# Patient Record
Sex: FEMALE | Race: OTHER | HISPANIC OR LATINO | URBAN - METROPOLITAN AREA
[De-identification: names, ages, dates, MRNs, and addresses within clinical notes are randomized per-mention and may not be internally consistent; named-entity substitution may affect disease eponyms.]

---

## 2020-11-23 ENCOUNTER — EMERGENCY (EMERGENCY)
Facility: HOSPITAL | Age: 35
LOS: 1 days | Discharge: HOME | End: 2020-11-23
Attending: SPECIALIST
Payer: SELF-PAY

## 2020-11-23 VITALS
DIASTOLIC BLOOD PRESSURE: 79 MMHG | TEMPERATURE: 99 F | WEIGHT: 134.92 LBS | OXYGEN SATURATION: 100 % | HEIGHT: 61 IN | RESPIRATION RATE: 20 BRPM | HEART RATE: 85 BPM | SYSTOLIC BLOOD PRESSURE: 113 MMHG

## 2020-11-23 VITALS
TEMPERATURE: 99 F | DIASTOLIC BLOOD PRESSURE: 78 MMHG | HEART RATE: 78 BPM | RESPIRATION RATE: 18 BRPM | SYSTOLIC BLOOD PRESSURE: 120 MMHG | OXYGEN SATURATION: 98 %

## 2020-11-23 LAB
ACANTHOCYTES BLD QL SMEAR: SLIGHT — SIGNIFICANT CHANGE UP
ALBUMIN SERPL ELPH-MCNC: 4.5 G/DL — SIGNIFICANT CHANGE UP (ref 3.3–5.2)
ALP SERPL-CCNC: 81 U/L — SIGNIFICANT CHANGE UP (ref 40–120)
ALT FLD-CCNC: 9 U/L — SIGNIFICANT CHANGE UP
ANION GAP SERPL CALC-SCNC: 13 MMOL/L — SIGNIFICANT CHANGE UP (ref 5–17)
ANISOCYTOSIS BLD QL: SIGNIFICANT CHANGE UP
APPEARANCE UR: CLEAR — SIGNIFICANT CHANGE UP
AST SERPL-CCNC: 19 U/L — SIGNIFICANT CHANGE UP
BACTERIA # UR AUTO: NEGATIVE — SIGNIFICANT CHANGE UP
BASOPHILS # BLD AUTO: 0.07 K/UL — SIGNIFICANT CHANGE UP (ref 0–0.2)
BASOPHILS NFR BLD AUTO: 1.7 % — SIGNIFICANT CHANGE UP (ref 0–2)
BILIRUB SERPL-MCNC: 0.3 MG/DL — LOW (ref 0.4–2)
BILIRUB UR-MCNC: NEGATIVE — SIGNIFICANT CHANGE UP
BLD GP AB SCN SERPL QL: SIGNIFICANT CHANGE UP
BUN SERPL-MCNC: 7 MG/DL — LOW (ref 8–20)
CALCIUM SERPL-MCNC: 9.5 MG/DL — SIGNIFICANT CHANGE UP (ref 8.6–10.2)
CHLORIDE SERPL-SCNC: 103 MMOL/L — SIGNIFICANT CHANGE UP (ref 98–107)
CO2 SERPL-SCNC: 23 MMOL/L — SIGNIFICANT CHANGE UP (ref 22–29)
COLOR SPEC: YELLOW — SIGNIFICANT CHANGE UP
COMMENT - URINE: SIGNIFICANT CHANGE UP
CREAT SERPL-MCNC: 0.47 MG/DL — LOW (ref 0.5–1.3)
DIFF PNL FLD: ABNORMAL
ELLIPTOCYTES BLD QL SMEAR: SLIGHT — SIGNIFICANT CHANGE UP
EOSINOPHIL # BLD AUTO: 0.04 K/UL — SIGNIFICANT CHANGE UP (ref 0–0.5)
EOSINOPHIL NFR BLD AUTO: 0.9 % — SIGNIFICANT CHANGE UP (ref 0–6)
EPI CELLS # UR: SIGNIFICANT CHANGE UP
FERRITIN SERPL-MCNC: 5 NG/ML — LOW (ref 15–150)
FOLATE SERPL-MCNC: 17.4 NG/ML — SIGNIFICANT CHANGE UP
GIANT PLATELETS BLD QL SMEAR: PRESENT — SIGNIFICANT CHANGE UP
GLUCOSE SERPL-MCNC: 87 MG/DL — SIGNIFICANT CHANGE UP (ref 70–99)
GLUCOSE UR QL: NEGATIVE MG/DL — SIGNIFICANT CHANGE UP
HCG SERPL-ACNC: <4 MIU/ML — SIGNIFICANT CHANGE UP
HCT VFR BLD CALC: 27.3 % — LOW (ref 34.5–45)
HGB BLD-MCNC: 7.3 G/DL — LOW (ref 11.5–15.5)
HYPOCHROMIA BLD QL: SLIGHT — SIGNIFICANT CHANGE UP
IRON SATN MFR SERPL: 13 UG/DL — LOW (ref 37–145)
IRON SATN MFR SERPL: 3 % — LOW (ref 14–50)
KETONES UR-MCNC: NEGATIVE — SIGNIFICANT CHANGE UP
LEUKOCYTE ESTERASE UR-ACNC: ABNORMAL
LIDOCAIN IGE QN: 44 U/L — SIGNIFICANT CHANGE UP (ref 22–51)
LYMPHOCYTES # BLD AUTO: 1.34 K/UL — SIGNIFICANT CHANGE UP (ref 1–3.3)
LYMPHOCYTES # BLD AUTO: 30.4 % — SIGNIFICANT CHANGE UP (ref 13–44)
MANUAL SMEAR VERIFICATION: SIGNIFICANT CHANGE UP
MCHC RBC-ENTMCNC: 17.8 PG — LOW (ref 27–34)
MCHC RBC-ENTMCNC: 26.7 GM/DL — LOW (ref 32–36)
MCV RBC AUTO: 66.6 FL — LOW (ref 80–100)
MICROCYTES BLD QL: SIGNIFICANT CHANGE UP
MONOCYTES # BLD AUTO: 0.11 K/UL — SIGNIFICANT CHANGE UP (ref 0–0.9)
MONOCYTES NFR BLD AUTO: 2.6 % — SIGNIFICANT CHANGE UP (ref 2–14)
NEUTROPHILS # BLD AUTO: 2.83 K/UL — SIGNIFICANT CHANGE UP (ref 1.8–7.4)
NEUTROPHILS NFR BLD AUTO: 64.4 % — SIGNIFICANT CHANGE UP (ref 43–77)
NITRITE UR-MCNC: NEGATIVE — SIGNIFICANT CHANGE UP
OVALOCYTES BLD QL SMEAR: SLIGHT — SIGNIFICANT CHANGE UP
PH UR: 6 — SIGNIFICANT CHANGE UP (ref 5–8)
PLAT MORPH BLD: NORMAL — SIGNIFICANT CHANGE UP
PLATELET # BLD AUTO: 315 K/UL — SIGNIFICANT CHANGE UP (ref 150–400)
POIKILOCYTOSIS BLD QL AUTO: SLIGHT — SIGNIFICANT CHANGE UP
POLYCHROMASIA BLD QL SMEAR: SLIGHT — SIGNIFICANT CHANGE UP
POTASSIUM SERPL-MCNC: 4.1 MMOL/L — SIGNIFICANT CHANGE UP (ref 3.5–5.3)
POTASSIUM SERPL-SCNC: 4.1 MMOL/L — SIGNIFICANT CHANGE UP (ref 3.5–5.3)
PROT SERPL-MCNC: 7.7 G/DL — SIGNIFICANT CHANGE UP (ref 6.6–8.7)
PROT UR-MCNC: 30 MG/DL
RBC # BLD: 4.1 M/UL — SIGNIFICANT CHANGE UP (ref 3.8–5.2)
RBC # FLD: 20.5 % — HIGH (ref 10.3–14.5)
RBC BLD AUTO: ABNORMAL
RBC CASTS # UR COMP ASSIST: ABNORMAL /HPF (ref 0–4)
SODIUM SERPL-SCNC: 139 MMOL/L — SIGNIFICANT CHANGE UP (ref 135–145)
SP GR SPEC: 1.02 — SIGNIFICANT CHANGE UP (ref 1.01–1.02)
TIBC SERPL-MCNC: 508 UG/DL — HIGH (ref 220–430)
TRANSFERRIN SERPL-MCNC: 355 MG/DL — SIGNIFICANT CHANGE UP (ref 192–382)
UROBILINOGEN FLD QL: NEGATIVE MG/DL — SIGNIFICANT CHANGE UP
VIT B12 SERPL-MCNC: 1368 PG/ML — HIGH (ref 232–1245)
WBC # BLD: 4.4 K/UL — SIGNIFICANT CHANGE UP (ref 3.8–10.5)
WBC # FLD AUTO: 4.4 K/UL — SIGNIFICANT CHANGE UP (ref 3.8–10.5)
WBC UR QL: SIGNIFICANT CHANGE UP

## 2020-11-23 PROCEDURE — 83550 IRON BINDING TEST: CPT

## 2020-11-23 PROCEDURE — 99285 EMERGENCY DEPT VISIT HI MDM: CPT

## 2020-11-23 PROCEDURE — 80053 COMPREHEN METABOLIC PANEL: CPT

## 2020-11-23 PROCEDURE — 93005 ELECTROCARDIOGRAM TRACING: CPT

## 2020-11-23 PROCEDURE — 99284 EMERGENCY DEPT VISIT MOD MDM: CPT | Mod: 25

## 2020-11-23 PROCEDURE — 36415 COLL VENOUS BLD VENIPUNCTURE: CPT

## 2020-11-23 PROCEDURE — 83690 ASSAY OF LIPASE: CPT

## 2020-11-23 PROCEDURE — 84702 CHORIONIC GONADOTROPIN TEST: CPT

## 2020-11-23 PROCEDURE — 85025 COMPLETE CBC W/AUTO DIFF WBC: CPT

## 2020-11-23 PROCEDURE — 84466 ASSAY OF TRANSFERRIN: CPT

## 2020-11-23 PROCEDURE — 70450 CT HEAD/BRAIN W/O DYE: CPT

## 2020-11-23 PROCEDURE — 93010 ELECTROCARDIOGRAM REPORT: CPT

## 2020-11-23 PROCEDURE — 70450 CT HEAD/BRAIN W/O DYE: CPT | Mod: 26

## 2020-11-23 PROCEDURE — 86900 BLOOD TYPING SEROLOGIC ABO: CPT

## 2020-11-23 PROCEDURE — 82746 ASSAY OF FOLIC ACID SERUM: CPT

## 2020-11-23 PROCEDURE — 82728 ASSAY OF FERRITIN: CPT

## 2020-11-23 PROCEDURE — 83540 ASSAY OF IRON: CPT

## 2020-11-23 PROCEDURE — 86850 RBC ANTIBODY SCREEN: CPT

## 2020-11-23 PROCEDURE — 82607 VITAMIN B-12: CPT

## 2020-11-23 PROCEDURE — T1013: CPT

## 2020-11-23 PROCEDURE — 86901 BLOOD TYPING SEROLOGIC RH(D): CPT

## 2020-11-23 PROCEDURE — 81001 URINALYSIS AUTO W/SCOPE: CPT

## 2020-11-23 RX ORDER — OMEPRAZOLE 10 MG/1
1 CAPSULE, DELAYED RELEASE ORAL
Qty: 10 | Refills: 0
Start: 2020-11-23 | End: 2020-12-02

## 2020-11-23 RX ORDER — FERROUS SULFATE 325(65) MG
1 TABLET ORAL
Qty: 10 | Refills: 0
Start: 2020-11-23 | End: 2020-12-02

## 2020-11-23 NOTE — ED PROVIDER NOTE - NS ED ROS FT
Constitutional: (-) fever  (-)chills  (-)sweats  Eyes/ENT: (-)   Cardiovascular: (-) chest pain, (-) palpitations (-) edema   Respiratory: (-) cough, (-) shortness of breath   Gastrointestinal: (-)nausea  (-)vomiting, (-) diarrhea  (-) abdominal pain   :  (-)dysuria, (-)frequency, (-)urgency, (-)hematuria  Musculoskeletal: (-) neck pain, (-) back pain, (-) joint pain  Integumentary: (-) rash, (-) edema  Neurological: (-) headache, (-) altered mental status  (-)LOC  +dizziness

## 2020-11-23 NOTE — ED PROVIDER NOTE - PROGRESS NOTE DETAILS
PT evaluated by intake physician. HPI/PE/ROS as noted above. Will follow up plan per intake physician. Pt found to be anemic. Offered transfusion but pt does not want. Will dc with PPI. Pt advised to avoid NSAIDs and will prescribe iron. Will dc with clinic f/u.

## 2020-11-23 NOTE — ED ADULT NURSE NOTE - OBJECTIVE STATEMENT
Assumed care at 1030 pt co dizziness, like the room was spinning when she was laying down. pt denies any N.V, fevers, chills, recent falls, pt states she feel sa bit unsteady as well.

## 2020-11-23 NOTE — ED PROVIDER NOTE - NSFOLLOWUPINSTRUCTIONS_ED_ALL_ED_FT
Follow up with [facility] clinic.   Take medication as prescribed.  Come back with new or worsening symptoms.     Anemia    Anemia is a condition in which the concentration of red blood cells or hemoglobin in the blood is below normal. Hemoglobin is a substance in red blood cells that carries oxygen to the tissues of the body. Anemia results in not enough oxygen reaching these tissues which can cause symptoms such as weakness, dizziness/lightheadedness, shortness of breath, chest pain, paleness, or nausea. The cause of your anemia may or may not be determined immediately. If your hemoglobin was dangerously low, you may have received a blood transfusion. Usually reactions to transfusions occur immediately but monitor yourself for any fevers, rash, or shortness of breath.    SEEK IMMEDIATE MEDICAL CARE IF YOU HAVE ANY OF THE FOLLOWING SYMPTOMS: extreme weakness/chest pain/shortness of breath, black or bloody stools, vomiting blood, fainting, fever, or any signs of dehydration.

## 2020-11-23 NOTE — ED PROVIDER NOTE - OBJECTIVE STATEMENT
35yoF; with PMH signif for Morbid Obesity (s/p bariatric surgery), Fibroids, Anemia(never transfused)-- now p/w dizziness. dizziness--worse with movement, room spinning sensation, not associated n/v, denies tinnitus.  denies f/c/s. denies cough. denies cough. denies recent URI.   patient also c/o lesion over right angular area of lip. c/o burning sensation over lips over past year. also c/o intolerance of spicy food for 1 year.  denies tongue numbness. has been using abreva for lesion, but the lips continue to be burning.   denies any special diets.  PMH:  Anemia  SOCIAL: No tobacco/illicit substance use/socialEtOH    SOCIAL: No tobacco/illicit substance use/socialEtOH

## 2020-11-23 NOTE — ED PROVIDER NOTE - PHYSICAL EXAMINATION
Gen: Alert, NAD  Head: NC, AT, PERRL, EOMI, normal lids/conjunctiva  Neck: +supple, no tenderness/meningismus/JVD, +Trachea midline  Pulm: Bilateral BS, normal resp effort, no wheeze/stridor/retractions  CV: RRR, no M/R/G, 2+dist pulses  Abd: soft, NT/ND, +BS, no hepatosplenomegaly  Mskel: ROM intact x4 extremities.  no edema/erythema/cyanosis  Skin: no rash, warm, dry  Neuro: AAOx3, no sensory/motor deficits, CN 2-12 intact, -[place]

## 2021-04-28 NOTE — ED PROVIDER NOTE - PATIENT PORTAL LINK FT
[employer]  Social Work Navigator Encounter     Patient Name:  Desiree Zepeda    Medical History:     Advance Directives:    Narrative: Pt called/communicated through My Chart concerned he has to have a consult before the biopsy/procedure. SW explained this is the process. A MD is not able to complete a procedure without first meeting a patient and explaining what is to be done. Sveta Ball knows Dr. Deirdre Serrano explained but the person doing the procedure must meet and explain. Pt states he is looking at [id number] [facility] and he has 2 appts on the same day. SW advised him to call whoever called him to make the appt who will explain what the 2 appts are. PT acknowledged.        Barriers to Care:     Plan:    Referral:
You can access the FollowMyHealth Patient Portal offered by [facility] by registering at the following website: [email or web address]. By joining [employer]’s FollowMyHealth portal, you will also be able to view your health information using other applications (apps) compatible with our system.

## 2021-05-12 ENCOUNTER — EMERGENCY (EMERGENCY)
Facility: HOSPITAL | Age: 36
LOS: 1 days | Discharge: HOME | End: 2021-05-12
Attending: EMERGENCY MEDICINE
Payer: SELF-PAY

## 2021-05-12 VITALS
TEMPERATURE: 98 F | OXYGEN SATURATION: 99 % | HEART RATE: 75 BPM | SYSTOLIC BLOOD PRESSURE: 115 MMHG | RESPIRATION RATE: 19 BRPM | DIASTOLIC BLOOD PRESSURE: 65 MMHG

## 2021-05-12 VITALS
DIASTOLIC BLOOD PRESSURE: 73 MMHG | HEART RATE: 89 BPM | HEIGHT: 61 IN | RESPIRATION RATE: 18 BRPM | OXYGEN SATURATION: 99 % | SYSTOLIC BLOOD PRESSURE: 107 MMHG | WEIGHT: 125 LBS | TEMPERATURE: 98 F

## 2021-05-12 LAB
ALBUMIN SERPL ELPH-MCNC: 3.9 G/DL — SIGNIFICANT CHANGE UP (ref 3.3–5.2)
ALP SERPL-CCNC: 71 U/L — SIGNIFICANT CHANGE UP (ref 40–120)
ALT FLD-CCNC: 10 U/L — SIGNIFICANT CHANGE UP
ANION GAP SERPL CALC-SCNC: 8 MMOL/L — SIGNIFICANT CHANGE UP (ref 5–17)
ANISOCYTOSIS BLD QL: SLIGHT — SIGNIFICANT CHANGE UP
APPEARANCE UR: CLEAR — SIGNIFICANT CHANGE UP
APTT BLD: 33.7 SEC — SIGNIFICANT CHANGE UP (ref 27.5–35.5)
AST SERPL-CCNC: 14 U/L — SIGNIFICANT CHANGE UP
BACTERIA # UR AUTO: NEGATIVE — SIGNIFICANT CHANGE UP
BASOPHILS # BLD AUTO: 0.12 K/UL — SIGNIFICANT CHANGE UP (ref 0–0.2)
BASOPHILS NFR BLD AUTO: 1.7 % — SIGNIFICANT CHANGE UP (ref 0–2)
BILIRUB SERPL-MCNC: <0.2 MG/DL — LOW (ref 0.4–2)
BILIRUB UR-MCNC: NEGATIVE — SIGNIFICANT CHANGE UP
BLD GP AB SCN SERPL QL: SIGNIFICANT CHANGE UP
BUN SERPL-MCNC: 9 MG/DL — SIGNIFICANT CHANGE UP (ref 8–20)
BURR CELLS BLD QL SMEAR: PRESENT — SIGNIFICANT CHANGE UP
CALCIUM SERPL-MCNC: 9.1 MG/DL — SIGNIFICANT CHANGE UP (ref 8.6–10.2)
CHLORIDE SERPL-SCNC: 105 MMOL/L — SIGNIFICANT CHANGE UP (ref 98–107)
CO2 SERPL-SCNC: 24 MMOL/L — SIGNIFICANT CHANGE UP (ref 22–29)
COLOR SPEC: YELLOW — SIGNIFICANT CHANGE UP
CREAT SERPL-MCNC: 0.52 MG/DL — SIGNIFICANT CHANGE UP (ref 0.5–1.3)
DACRYOCYTES BLD QL SMEAR: SLIGHT — SIGNIFICANT CHANGE UP
DIFF PNL FLD: ABNORMAL
ELLIPTOCYTES BLD QL SMEAR: SLIGHT — SIGNIFICANT CHANGE UP
EOSINOPHIL # BLD AUTO: 0 K/UL — SIGNIFICANT CHANGE UP (ref 0–0.5)
EOSINOPHIL NFR BLD AUTO: 0 % — SIGNIFICANT CHANGE UP (ref 0–6)
EPI CELLS # UR: SIGNIFICANT CHANGE UP
GIANT PLATELETS BLD QL SMEAR: PRESENT — SIGNIFICANT CHANGE UP
GLUCOSE SERPL-MCNC: 84 MG/DL — SIGNIFICANT CHANGE UP (ref 70–99)
GLUCOSE UR QL: NEGATIVE MG/DL — SIGNIFICANT CHANGE UP
HCG SERPL-ACNC: <4 MIU/ML — SIGNIFICANT CHANGE UP
HCT VFR BLD CALC: 24.9 % — LOW (ref 34.5–45)
HGB BLD-MCNC: 6.9 G/DL — CRITICAL LOW (ref 11.5–15.5)
HYPOCHROMIA BLD QL: SLIGHT — SIGNIFICANT CHANGE UP
INR BLD: 1.14 RATIO — SIGNIFICANT CHANGE UP (ref 0.88–1.16)
KETONES UR-MCNC: ABNORMAL
LEUKOCYTE ESTERASE UR-ACNC: NEGATIVE — SIGNIFICANT CHANGE UP
LIDOCAIN IGE QN: 42 U/L — SIGNIFICANT CHANGE UP (ref 22–51)
LYMPHOCYTES # BLD AUTO: 1.92 K/UL — SIGNIFICANT CHANGE UP (ref 1–3.3)
LYMPHOCYTES # BLD AUTO: 28.1 % — SIGNIFICANT CHANGE UP (ref 13–44)
MACROCYTES BLD QL: SLIGHT — SIGNIFICANT CHANGE UP
MANUAL SMEAR VERIFICATION: SIGNIFICANT CHANGE UP
MCHC RBC-ENTMCNC: 18.5 PG — LOW (ref 27–34)
MCHC RBC-ENTMCNC: 27.7 GM/DL — LOW (ref 32–36)
MCV RBC AUTO: 66.9 FL — LOW (ref 80–100)
MICROCYTES BLD QL: SLIGHT — SIGNIFICANT CHANGE UP
MONOCYTES # BLD AUTO: 0.54 K/UL — SIGNIFICANT CHANGE UP (ref 0–0.9)
MONOCYTES NFR BLD AUTO: 7.9 % — SIGNIFICANT CHANGE UP (ref 2–14)
NEUTROPHILS # BLD AUTO: 4.19 K/UL — SIGNIFICANT CHANGE UP (ref 1.8–7.4)
NEUTROPHILS NFR BLD AUTO: 61.4 % — SIGNIFICANT CHANGE UP (ref 43–77)
NITRITE UR-MCNC: NEGATIVE — SIGNIFICANT CHANGE UP
OVALOCYTES BLD QL SMEAR: SLIGHT — SIGNIFICANT CHANGE UP
PH UR: 6 — SIGNIFICANT CHANGE UP (ref 5–8)
PLAT MORPH BLD: NORMAL — SIGNIFICANT CHANGE UP
PLATELET # BLD AUTO: 273 K/UL — SIGNIFICANT CHANGE UP (ref 150–400)
PLATELET COUNT - ESTIMATE: NORMAL — SIGNIFICANT CHANGE UP
POIKILOCYTOSIS BLD QL AUTO: SLIGHT — SIGNIFICANT CHANGE UP
POLYCHROMASIA BLD QL SMEAR: SLIGHT — SIGNIFICANT CHANGE UP
POTASSIUM SERPL-MCNC: 4.3 MMOL/L — SIGNIFICANT CHANGE UP (ref 3.5–5.3)
POTASSIUM SERPL-SCNC: 4.3 MMOL/L — SIGNIFICANT CHANGE UP (ref 3.5–5.3)
PROT SERPL-MCNC: 6.8 G/DL — SIGNIFICANT CHANGE UP (ref 6.6–8.7)
PROT UR-MCNC: NEGATIVE MG/DL — SIGNIFICANT CHANGE UP
PROTHROM AB SERPL-ACNC: 13.1 SEC — SIGNIFICANT CHANGE UP (ref 10.6–13.6)
RBC # BLD: 3.72 M/UL — LOW (ref 3.8–5.2)
RBC # FLD: 21.3 % — HIGH (ref 10.3–14.5)
RBC BLD AUTO: ABNORMAL
RBC CASTS # UR COMP ASSIST: ABNORMAL /HPF (ref 0–4)
SMUDGE CELLS # BLD: PRESENT — SIGNIFICANT CHANGE UP
SODIUM SERPL-SCNC: 137 MMOL/L — SIGNIFICANT CHANGE UP (ref 135–145)
SP GR SPEC: 1.01 — SIGNIFICANT CHANGE UP (ref 1.01–1.02)
UROBILINOGEN FLD QL: NEGATIVE MG/DL — SIGNIFICANT CHANGE UP
VARIANT LYMPHS # BLD: 0.9 % — SIGNIFICANT CHANGE UP (ref 0–6)
WBC # BLD: 6.82 K/UL — SIGNIFICANT CHANGE UP (ref 3.8–10.5)
WBC # FLD AUTO: 6.82 K/UL — SIGNIFICANT CHANGE UP (ref 3.8–10.5)
WBC UR QL: NEGATIVE — SIGNIFICANT CHANGE UP

## 2021-05-12 PROCEDURE — 74177 CT ABD & PELVIS W/CONTRAST: CPT | Mod: 26,MA

## 2021-05-12 PROCEDURE — 87086 URINE CULTURE/COLONY COUNT: CPT

## 2021-05-12 PROCEDURE — 86923 COMPATIBILITY TEST ELECTRIC: CPT

## 2021-05-12 PROCEDURE — 36415 COLL VENOUS BLD VENIPUNCTURE: CPT

## 2021-05-12 PROCEDURE — 85025 COMPLETE CBC W/AUTO DIFF WBC: CPT

## 2021-05-12 PROCEDURE — 80053 COMPREHEN METABOLIC PANEL: CPT

## 2021-05-12 PROCEDURE — P9016: CPT

## 2021-05-12 PROCEDURE — 85730 THROMBOPLASTIN TIME PARTIAL: CPT

## 2021-05-12 PROCEDURE — 85610 PROTHROMBIN TIME: CPT

## 2021-05-12 PROCEDURE — 81001 URINALYSIS AUTO W/SCOPE: CPT

## 2021-05-12 PROCEDURE — 86900 BLOOD TYPING SEROLOGIC ABO: CPT

## 2021-05-12 PROCEDURE — 99285 EMERGENCY DEPT VISIT HI MDM: CPT | Mod: 25

## 2021-05-12 PROCEDURE — 84702 CHORIONIC GONADOTROPIN TEST: CPT

## 2021-05-12 PROCEDURE — 99285 EMERGENCY DEPT VISIT HI MDM: CPT

## 2021-05-12 PROCEDURE — 76856 US EXAM PELVIC COMPLETE: CPT | Mod: 26

## 2021-05-12 PROCEDURE — 86901 BLOOD TYPING SEROLOGIC RH(D): CPT

## 2021-05-12 PROCEDURE — 83690 ASSAY OF LIPASE: CPT

## 2021-05-12 PROCEDURE — 76830 TRANSVAGINAL US NON-OB: CPT

## 2021-05-12 PROCEDURE — 86850 RBC ANTIBODY SCREEN: CPT

## 2021-05-12 PROCEDURE — 76830 TRANSVAGINAL US NON-OB: CPT | Mod: 26

## 2021-05-12 PROCEDURE — 74177 CT ABD & PELVIS W/CONTRAST: CPT

## 2021-05-12 PROCEDURE — 76856 US EXAM PELVIC COMPLETE: CPT

## 2021-05-12 PROCEDURE — 36430 TRANSFUSION BLD/BLD COMPNT: CPT

## 2021-05-12 RX ORDER — SODIUM CHLORIDE 9 MG/ML
1000 INJECTION INTRAMUSCULAR; INTRAVENOUS; SUBCUTANEOUS ONCE
Refills: 0 | Status: COMPLETED | OUTPATIENT
Start: 2021-05-12 | End: 2021-05-12

## 2021-05-12 RX ORDER — FERROUS SULFATE 325(65) MG
1 TABLET ORAL
Qty: 14 | Refills: 0
Start: 2021-05-12 | End: 2021-05-25

## 2021-05-12 RX ORDER — IOHEXOL 300 MG/ML
30 INJECTION, SOLUTION INTRAVENOUS ONCE
Refills: 0 | Status: COMPLETED | OUTPATIENT
Start: 2021-05-12 | End: 2021-05-12

## 2021-05-12 RX ADMIN — SODIUM CHLORIDE 1000 MILLILITER(S): 9 INJECTION INTRAMUSCULAR; INTRAVENOUS; SUBCUTANEOUS at 13:51

## 2021-05-12 RX ADMIN — IOHEXOL 30 MILLILITER(S): 300 INJECTION, SOLUTION INTRAVENOUS at 14:33

## 2021-05-12 NOTE — ED PROVIDER NOTE - OBJECTIVE STATEMENT
34 y/o F with PMH significant for Anemia, and Fibroids on Iron s/p gastric sleeve now p/w weakness. Patient reports dizziness, pelvic pain that started over the past few days, but normally has these symptoms during her period. Also reports heaviness in her right leg. Denies urinary symptoms       PMH: Anemia, Fibroids, on Iron s/p gastric sleeve; : Amaris   SOCIAL: +social EtOH use, denies tobacco/illicit drug use 36 y/o F with PMH significant for Morbid Obesity (s/p gastric sleeve 5 [place] ago [place]), Anemia, and Fibroids; now p/w generalized  weakness. Patient reports frequently having pelvic pain with periods that began 1 week ago and ended a few days ago.  reports for past few days after period ended, she has been increasingly dizzy and nausea. denies cp/sob/palp. denies abd pain currently. denies f/c/s.  c/o some pain down right leg. denies focal weakness. denies dysuria, frequency, urgency.   PMH: Anemia, Fibroids, on Iron s/p gastric sleeve; : Amaris   SOCIAL: +social EtOH use, denies tobacco/illicit drug use

## 2021-05-12 NOTE — ED ADULT NURSE REASSESSMENT NOTE - NS ED NURSE REASSESS COMMENT FT1
pre PRBC VS done. blood transfusion consent scanned in chart. PRBCs verified at bedside with 2 RNs. pt verbalizes understanding of possible s/sx of blood transfusion reaction. pt moved to A3 and placed on cardiac monitor/. safety maintained, call bell in reach.

## 2021-05-12 NOTE — ED PROVIDER NOTE - NS ED ROS FT
Constitutional: (-) fever  (-)chills  (-)sweats  Eyes/ENT: (-) blurry vision, (-) epistaxis  (-)rhinorrhea   (-) sore throat    Cardiovascular: (-) chest pain, (-) palpitations (-) edema   Respiratory: (-) cough, (-) shortness of breath   Gastrointestinal: (-)nausea  (-)vomiting, (-) diarrhea  (-) abdominal pain (+) pelvic pain  :  (-)dysuria, (-)frequency, (-)urgency, (-)hematuria  Musculoskeletal: (-) neck pain, (-) back pain, (-) joint pain  Integumentary: (-) rash, (-) edema  Neurological: (-) headache, (-) altered mental status  (-)LOC

## 2021-05-12 NOTE — CONSULT NOTE ADULT - SUBJECTIVE AND OBJECTIVE BOX
# [id number]   # [id number]    A 36yo G0 LMP 4/22/2021 presenting with dizziness and weakness. She reports cramping like abdominal pain while she has periods.     PMH: denies  PSH: abdominal myomectomy x2 - 2011, 2015; gastric sleeve 2016   Allergies: No Known Allergies  Meds: none  FAMILY HISTORY: denies   Social History: Denies ETOH, and drugs. Quit smoking 2 years ago.    POB/GYN Hx: fibroids     Vital Signs:  Vital Signs Last 24 Hrs  T(C): 36.7 (12 May 2021 16:14), Max: 36.8 (12 May 2021 12:11)  T(F): 98 (12 May 2021 16:14), Max: 98.2 (12 May 2021 12:11)  HR: 82 (12 May 2021 16:14) (82 - 89)  BP: 98/68 (12 May 2021 16:14) (98/68 - 107/73)  RR: 17 (12 May 2021 16:14) (17 - 18)  SpO2: 100% (12 May 2021 16:14) (99% - 100%)    Physical Exam:  General: NAD  Abdomen: soft, NT.  Pelvic: Normal external genitalia, no lesions in vaginal canal, no pooling in vaginal vault, no lesions on cervix, os is closed, no discharge from cervical os. Negative CMT, negative adnexal tenderness.  Ext: No cyanosis, edema or calf tenderness.     Labs:                          6.9    6.82  )-----------( 273      ( 12 May 2021 14:01 )             24.9   05-12    137  |  105  |  9.0  ----------------------------<  84  4.3   |  24.0  |  0.52    Ca    9.1      12 May 2021 14:01    TPro  6.8  /  Alb  3.9  /  TBili  <0.2<L>  /  DBili  x   /  AST  14  /  ALT  10  /  AlkPhos  71  05-12    PT/INR - ( 12 May 2021 14:01 )   PT: 13.1 sec;   INR: 1.14 ratio      PTT - ( 12 May 2021 14:01 )  PTT:33.7 sec  HCG Quantitative, Serum: <4.0 mIU/mL (05-12-21 @ 14:01)    Radiology:  < from: CT Abdomen and Pelvis w/ Oral Cont and w/ IV Cont (05.12.21 @ 15:33) >  INTERPRETATION:  CLINICAL INFORMATION: Abdominal pain. History of gastric sleeve surgery.    COMPARISON: None.    CONTRAST/COMPLICATIONS:  IV Contrast: Omnipaque 300  92 cc administered   8 cc discarded  Oral Contrast: Omnipaque 300 + Fruit 2o  Complications: None reported at time of study completion    PROCEDURE:  CT of the Abdomen and Pelvis was performed.  Sagittal and coronal reformats were performed.    FINDINGS:  LOWER CHEST: Within normal limits.    LIVER: There is a well-circumscribed 4.7 x 3.9 cm hypoattenuating lesion replacing the caudate lobe which demonstrates peripheral puddling of contrast, compatible with a hemangioma. Otherwise, the liver is unremarkable in appearance and enhancement. The hepatic veins and portal vein are patent..  BILE DUCTS: Normal caliber.  GALLBLADDER: Within normal limits.  SPLEEN: Within normal limits.  PANCREAS: Within normal limits.  ADRENALS: Within normal limits.  KIDNEYS/URETERS: Within normal limits.    BLADDER: Within normal limits.  REPRODUCTIVE ORGANS: There is a 4.7 x 4.5 x 3.8 cm heterogeneously hypoenhancing rounded lesion arising from the left lateral uterine body, prolapsing into theendometrial canal, favored to represent a large centrally necrotic submucosal fibroid. The adnexa are unremarkable by CT criteria.    BOWEL: Status post sleeve gastrectomy. Evaluation of the distal small bowel and colon is suboptimal due to limited transit of oral contrast and stool. Otherwise, small and large bowel loops are unremarkable with no evidence of obstruction, bowel wall thickening, or inflammatory stranding of the adjacent mesenteric fat.  PERITONEUM: No ascites.  VESSELS: Within normal limits.  RETROPERITONEUM/LYMPH NODES: No lymphadenopathy.  ABDOMINAL WALL: Within normal limits.  BONES: Within normal limits.    IMPRESSION:  1. Large centrally necrotic fibroid prolapsing into the endometrial cavity. Correlate with pelvic ultrasound.  2. Cavernous hemangioma in the caudate lobe of the liver.  3. Otherwise, no evidence of acute inflammatory or obstructive process in the abdomen and pelvis.    < end of copied text >    # [id number]   # [id number]    A 36yo G0 LMP 4/22/2021 presenting with dizziness and weakness. She reports cramping like abdominal pain while she has periods. Reports having heavy bleeding with her periods. Currently denies pain, VB, N/V, fevers.     PMH: denies  PSH: abdominal myomectomy x2 - 2011, 2015; gastric sleeve 2016   Allergies: No Known Allergies  Meds: none  FAMILY HISTORY: denies   Social History: Denies ETOH, and drugs. Quit smoking 2 years ago.    POB/GYN Hx: hx fibroids     Vital Signs:  Vital Signs Last 24 Hrs  T(C): 36.7 (12 May 2021 16:14), Max: 36.8 (12 May 2021 12:11)  T(F): 98 (12 May 2021 16:14), Max: 98.2 (12 May 2021 12:11)  HR: 82 (12 May 2021 16:14) (82 - 89)  BP: 98/68 (12 May 2021 16:14) (98/68 - 107/73)  RR: 17 (12 May 2021 16:14) (17 - 18)  SpO2: 100% (12 May 2021 16:14) (99% - 100%)    Physical Exam:  General: NAD  Abdomen: soft, NT  Pelvic: patient declined   Ext: No cyanosis, edema or calf tenderness b/l     Labs:                        6.9    6.82  )-----------( 273      ( 12 May 2021 14:01 )             24.9   05-12    137  |  105  |  9.0  ----------------------------<  84  4.3   |  24.0  |  0.52    Ca    9.1      12 May 2021 14:01    TPro  6.8  /  Alb  3.9  /  TBili  <0.2<L>  /  DBili  x   /  AST  14  /  ALT  10  /  AlkPhos  71  05-12    PT/INR - ( 12 May 2021 14:01 )   PT: 13.1 sec;   INR: 1.14 ratio      PTT - ( 12 May 2021 14:01 )  PTT:33.7 sec  HCG Quantitative, Serum: <4.0 mIU/mL (05-12-21 @ 14:01)    Radiology:  < from: CT Abdomen and Pelvis w/ Oral Cont and w/ IV Cont (05.12.21 @ 15:33) >  INTERPRETATION:  CLINICAL INFORMATION: Abdominal pain. History of gastric sleeve surgery.    COMPARISON: None.    CONTRAST/COMPLICATIONS:  IV Contrast: Omnipaque 300  92 cc administered   8 cc discarded  Oral Contrast: Omnipaque 300 + Fruit 2o  Complications: None reported at time of study completion    PROCEDURE:  CT of the Abdomen and Pelvis was performed.  Sagittal and coronal reformats were performed.    FINDINGS:  LOWER CHEST: Within normal limits.    LIVER: There is a well-circumscribed 4.7 x 3.9 cm hypoattenuating lesion replacing the caudate lobe which demonstrates peripheral puddling of contrast, compatible with a hemangioma. Otherwise, the liver is unremarkable in appearance and enhancement. The hepatic veins and portal vein are patent..  BILE DUCTS: Normal caliber.  GALLBLADDER: Within normal limits.  SPLEEN: Within normal limits.  PANCREAS: Within normal limits.  ADRENALS: Within normal limits.  KIDNEYS/URETERS: Within normal limits.    BLADDER: Within normal limits.  REPRODUCTIVE ORGANS: There is a 4.7 x 4.5 x 3.8 cm heterogeneously hypoenhancing rounded lesion arising from the left lateral uterine body, prolapsing into theendometrial canal, favored to represent a large centrally necrotic submucosal fibroid. The adnexa are unremarkable by CT criteria.    BOWEL: Status post sleeve gastrectomy. Evaluation of the distal small bowel and colon is suboptimal due to limited transit of oral contrast and stool. Otherwise, small and large bowel loops are unremarkable with no evidence of obstruction, bowel wall thickening, or inflammatory stranding of the adjacent mesenteric fat.  PERITONEUM: No ascites.  VESSELS: Within normal limits.  RETROPERITONEUM/LYMPH NODES: No lymphadenopathy.  ABDOMINAL WALL: Within normal limits.  BONES: Within normal limits.    IMPRESSION:  1. Large centrally necrotic fibroid prolapsing into the endometrial cavity. Correlate with pelvic ultrasound.  2. Cavernous hemangioma in the caudate lobe of the liver.  3. Otherwise, no evidence of acute inflammatory or obstructive process in the abdomen and pelvis.    < end of copied text >

## 2021-05-12 NOTE — ED PROVIDER NOTE - PHYSICAL EXAMINATION
General:     NAD, well-nourished, well-appearing  Head:     NC/AT, EOMI, oral mucosa moist  Neck:     trachea midline  Lungs:     CTA b/l, no w/r/r  CVS:     S1S2, RRR, no m/g/r  Abd:     TTP @ suprapublic; +BS, s/nd, no organomegaly  Ext:    2+ radial and pedal pulses, no c/c/e  Neuro: AAOx3, no sensory/motor deficits       abdominal pain and malaise after period; Labs, ultrasound, and re-evaluate. General:     NAD, well-nourished, well-appearing  Head:     NC/AT, EOMI, oral mucosa moist  Neck:     trachea midline  Lungs:     CTA b/l, no w/r/r  CVS:     S1S2, RRR, no m/g/r  Abd:     TTP @ suprapublic; +BS, s/nd, no organomegaly  Ext:    2+ radial and pedal pulses, no c/c/e  Neuro: AAOx3, no sensory/motor deficits

## 2021-05-12 NOTE — CONSULT NOTE ADULT - ASSESSMENT
A 36yo G0 LMP 4/22/2021 presenting with AUB-L.     Patient with hx of fibroids and PSHx myomectomy x2, currently with symptomatic anemia Hgb 6.9. CT scan showing large necrosing fibroid prolapsing into endometrial cavity. Currently receiving 1u PRBC.     -Agree with blood transfusion  -Pelvic US to be done  -Pelvic exam declined  -F/U information for [facility] given     D/W Dr. Valdivia  A 36yo G0 LMP 4/22/2021 presenting with AUB-L.     Patient with hx of fibroids and PSHx myomectomy x2, currently with symptomatic anemia Hgb 6.9. CT scan showing large necrosing fibroid prolapsing into endometrial cavity. Currently receiving 1u PRBC.     -Agree with blood transfusion  -Pelvic US to be done  -Pelvic exam declined  -F/U information for [facility] given     05-12-21 @ 21:51    - Pelvic ultrasound done; appreciated submucosal fibroid  - patient with [facility] follow up on 5/24, will call if sooner follow-up needed  - educated on continued management at home  - no acute GYN intervention required  - counseled on signs/sx meriting return to hospital      D/W Dr. Valdivia

## 2021-05-12 NOTE — ED PROVIDER NOTE - NSFOLLOWUPINSTRUCTIONS_ED_ALL_ED_FT
por favor tome la medicación según lo prescrito  por favor, ck un seguimiento con la clínica de [facility] el 28/5/2021. rubi te escobedo dicho. es posible que reciba fidel llamada de aston para hacer un seguimiento antes  Regrese a la dixie de emergencias si tiene fiebre, sarpullido, dificultad, respiración o cualquier inquietud nueva  turner medicamento  take the iron peel with colace or laxative can cause constipation   Anemia    La anemia es fidel afección en la que la concentración de glóbulos rojos o hemoglobina en la lawson es inferior a lo normal. La hemoglobina es fidel sustancia de los glóbulos rojos que transporta oxígeno a los tejidos del cuerpo. La anemia hace que no llegue suficiente oxígeno a estos tejidos, lo que puede causar síntomas rubi debilidad, mareos / aturdimiento, dificultad para respirar, dolor en el pecho, palidez o náuseas. La causa de cantrell anemia puede o no determinarse de inmediato. Si cantrell hemoglobina estaba peligrosamente baja, es posible que haya recibido fidel transfusión de lawson. Por lo general, las reacciones a las transfusiones ocurren de inmediato, shukri controle si tiene fiebre, sarpullido o dificultad para respirar.    Reacción a fidel transfusión de lawson    LO QUE NECESITA SABER:    Fidel reacción a fidel transfusión de lawson es fidel respuesta dañina del sistema inmunológico a la lawson de un donante. Usted puede tener fidel reacción si recibe lawson donada que es del tipo equivocado o si usted es alérgico a algo que está en la lawson donada. Las reacciones pueden presentarse de inmediato o más adelante, y varían de leves a graves.    INSTRUCCIONES SOBRE EL SERGIO HOSPITALARIA:    Llame al 911 en rojas de presentar lo siguiente:  •Usted tiene salpullido, urticaria, inflamación o comezón en la piel.      •Usted tiene dificultad para respirar, falta de aire, sibilancia o tos.      •Cantrell garganta se shameka o blanca labios o lengua se inflaman.      •Usted tiene dificultad para tragar o hablar.      Busque atención médica de inmediato si:  •Usted sufre fidel convulsión.      •Usted presenta dolor de karlee o visión doble.      •Usted está aturdido, confundido o siente que se va a desmayar.      •Usted tiene náusea, diarrea o calambres abdominales, o está vomitando.      •Usted nota puntos pequeños de color morada o manchas moradas en cantrell cuerpo.      •Usted se siente mareado y débil erica los 7 días siguientes a cantrell transfusión.      •Usted siente cantrell piel sudorosa y fría.      •Blanca labios o uñas de las david se ponen azules.      •Cantrell piel o la parte carine de blanca ojos se arias kelby.      Comuníquese con cantrell médico si:  •Usted tiene preguntas o inquietudes acerca de las transfusiones de lawson.          Medicamentos:  •Los antihistamínicosreducen la comezón y la inflamación de fidel reacción alérgica leve.      •Esteroidespodrían ser administrados por unos días para evitar que regrese la inflamación.      •Medicamentospodrían ser administrados para reducir la fiebre.      •[place] blanca medicamentos rubi se le haya indicado.Consulte con cantrell médico si usted arturo que cantrell medicamento no le está ayudando o si presenta efectos secundarios. Infórmele si es alérgico a cualquier medicamento. Mantenga fidel lista actualizada de los medicamentos, las vitaminas y los productos herbales que aydin. Incluya los siguientes datos de los medicamentos: cantidad, frecuencia y motivo de administración. Traiga con usted la lista o los envases de las píldoras a blanca citas de seguimiento. Lleve la lista de los medicamentos con usted en rojas de fidel emergencia.      Evite otra reacción a fidel transfusión:  •Proporcione fidel historial médico completo.Informe a blanca médicos sobre cantrell condición de murray, transfusiones y embarazos.      •Alerte a blanca médicos sobre cualquier problema.Avísele a blanca médicos de inmediato si no se siente christopher. Ellos suspenderán la transfusión y tratarán blanca síntomas. Fidel buena razón para detener la transfusión es si usted presenta dolor, náuseas, comezón, o un moretón pam en el sitio de la transfusión.      •Pregunte si usted puede usar cantrell propia lawson.Es posible que usted pueda usar cantrell propia lawson erica la cirugía. Será necesario extraerle cantrell lawson y almacenarla unas semanas antes de la fecha programada para la cirugía.      •Lleve fidel identificación de alerta médica.Use accesorios o lleve fidel tarjeta que diga que usted tuvo fidel reacción a fidel transfusión de lawson. Los médicos podrían administrarle medicamento antes de la transfusión para evitar fidel reacción alérgica.      SEEK IMMEDIATE MEDICAL CARE IF YOU HAVE ANY OF THE FOLLOWING SYMPTOMS: extreme weakness/chest pain/shortness of breath, black or bloody stools, vomiting blood, fainting, fever, or any signs of dehydration.

## 2021-05-12 NOTE — ED PROVIDER NOTE - CARE PLAN
Principal Discharge DX:	Anemia, unspecified type  Secondary Diagnosis:	Uterine leiomyoma, unspecified location

## 2021-05-12 NOTE — ED ADULT NURSE NOTE - OBJECTIVE STATEMENT
35y female AOx4 c/o generalized weakness, dizziness, and pelvic pain. known hx of anemia, fibroids (and fibroid sx x2), gastric sleeve. respirations even and unlabored, denies chest discomfort. abd soft and nondistended. skin pale for race.

## 2021-05-12 NOTE — ED PROVIDER NOTE - PATIENT PORTAL LINK FT
You can access the FollowMyHealth Patient Portal offered by [facility] by registering at the following website: [email or web address]. By joining [employer]’s FollowMyHealth portal, you will also be able to view your health information using other applications (apps) compatible with our system.

## 2021-05-12 NOTE — ED PROVIDER NOTE - PROGRESS NOTE DETAILS
pt is seen By dr souza initially agreed with hx, PE and plan   pt has low H/H 6.8 never had blood transfusion . explained the pt risk vs benefit of the blood transfusion she agreed . signed the consent . pt is c.o uterine fibroid and prolonged menstruation had x twice fibroid surgery . hx of gastric sleeve 4-5 yrs ago denies any abd pain , diarrhea or black tarry stool . will continue  mon and pending labs , US and CT  yehuda fern[address] Ct with necrotic fibros consult GYN since Pt is f.u in [facility] clinic   pending US Ct with necrotic fibros consult GYN since Pt is f.u in [facility] clinic   pending US  pt is finished the transfusion  no reaction pt is feeling better .  called back GYN spoke with Dr Sanford updated regarding the US pelvis   pt has [facility] appointment on 5/28/2021 update the GYN . as per OBGYN they will call her and update her if she should been seen in [facility] sooner . pt si been informed , ferrous sulfate sent to pharmacy will d.c the pt

## 2021-05-13 LAB
CULTURE RESULTS: NO GROWTH — SIGNIFICANT CHANGE UP
SPECIMEN SOURCE: SIGNIFICANT CHANGE UP

## 2021-05-18 NOTE — ED ADULT NURSE NOTE - PATIENT/CAREGIVER OFFERED  INTERPRETER SERVICES
Left VM for pt to return my call.    If pt is interested in [facility] provider, Dr. Flex Jensen is accepting new patients.  [phone]      Mon, June 7th 10:20am [facility] appt.   
Pt returned my call.  Will establish care with Dr. Jensen.   
yes

## 2021-09-12 ENCOUNTER — EMERGENCY (EMERGENCY)
Facility: HOSPITAL | Age: 36
LOS: 1 days | Discharge: HOME | End: 2021-09-12
Attending: EMERGENCY MEDICINE
Payer: SELF-PAY

## 2021-09-12 VITALS
DIASTOLIC BLOOD PRESSURE: 78 MMHG | WEIGHT: 145.06 LBS | RESPIRATION RATE: 18 BRPM | OXYGEN SATURATION: 98 % | HEART RATE: 84 BPM | TEMPERATURE: 98 F | HEIGHT: 61 IN | SYSTOLIC BLOOD PRESSURE: 106 MMHG

## 2021-09-12 LAB
ALBUMIN SERPL ELPH-MCNC: 4.4 G/DL — SIGNIFICANT CHANGE UP (ref 3.3–5.2)
ALP SERPL-CCNC: 81 U/L — SIGNIFICANT CHANGE UP (ref 40–120)
ALT FLD-CCNC: 11 U/L — SIGNIFICANT CHANGE UP
ANION GAP SERPL CALC-SCNC: 13 MMOL/L — SIGNIFICANT CHANGE UP (ref 5–17)
ANISOCYTOSIS BLD QL: SIGNIFICANT CHANGE UP
AST SERPL-CCNC: 19 U/L — SIGNIFICANT CHANGE UP
BASOPHILS # BLD AUTO: 0.16 K/UL — SIGNIFICANT CHANGE UP (ref 0–0.2)
BASOPHILS NFR BLD AUTO: 2.6 % — HIGH (ref 0–2)
BILIRUB SERPL-MCNC: 0.4 MG/DL — SIGNIFICANT CHANGE UP (ref 0.4–2)
BUN SERPL-MCNC: 9.3 MG/DL — SIGNIFICANT CHANGE UP (ref 8–20)
CALCIUM SERPL-MCNC: 9.2 MG/DL — SIGNIFICANT CHANGE UP (ref 8.6–10.2)
CHLORIDE SERPL-SCNC: 102 MMOL/L — SIGNIFICANT CHANGE UP (ref 98–107)
CO2 SERPL-SCNC: 20 MMOL/L — LOW (ref 22–29)
CREAT SERPL-MCNC: 0.56 MG/DL — SIGNIFICANT CHANGE UP (ref 0.5–1.3)
ELLIPTOCYTES BLD QL SMEAR: SLIGHT — SIGNIFICANT CHANGE UP
EOSINOPHIL # BLD AUTO: 0.1 K/UL — SIGNIFICANT CHANGE UP (ref 0–0.5)
EOSINOPHIL NFR BLD AUTO: 1.7 % — SIGNIFICANT CHANGE UP (ref 0–6)
GIANT PLATELETS BLD QL SMEAR: PRESENT — SIGNIFICANT CHANGE UP
GLUCOSE SERPL-MCNC: 78 MG/DL — SIGNIFICANT CHANGE UP (ref 70–99)
HCG SERPL-ACNC: <4 MIU/ML — SIGNIFICANT CHANGE UP
HCT VFR BLD CALC: 29.1 % — LOW (ref 34.5–45)
HGB BLD-MCNC: 8.2 G/DL — LOW (ref 11.5–15.5)
HYPOCHROMIA BLD QL: SLIGHT — SIGNIFICANT CHANGE UP
LYMPHOCYTES # BLD AUTO: 1.94 K/UL — SIGNIFICANT CHANGE UP (ref 1–3.3)
LYMPHOCYTES # BLD AUTO: 32.5 % — SIGNIFICANT CHANGE UP (ref 13–44)
MANUAL SMEAR VERIFICATION: SIGNIFICANT CHANGE UP
MCHC RBC-ENTMCNC: 18.9 PG — LOW (ref 27–34)
MCHC RBC-ENTMCNC: 28.2 GM/DL — LOW (ref 32–36)
MCV RBC AUTO: 67.1 FL — LOW (ref 80–100)
MICROCYTES BLD QL: SIGNIFICANT CHANGE UP
MONOCYTES # BLD AUTO: 0.74 K/UL — SIGNIFICANT CHANGE UP (ref 0–0.9)
MONOCYTES NFR BLD AUTO: 12.3 % — SIGNIFICANT CHANGE UP (ref 2–14)
NEUTROPHILS # BLD AUTO: 2.83 K/UL — SIGNIFICANT CHANGE UP (ref 1.8–7.4)
NEUTROPHILS NFR BLD AUTO: 47.4 % — SIGNIFICANT CHANGE UP (ref 43–77)
OVALOCYTES BLD QL SMEAR: SLIGHT — SIGNIFICANT CHANGE UP
PLAT MORPH BLD: NORMAL — SIGNIFICANT CHANGE UP
PLATELET # BLD AUTO: 324 K/UL — SIGNIFICANT CHANGE UP (ref 150–400)
POIKILOCYTOSIS BLD QL AUTO: SLIGHT — SIGNIFICANT CHANGE UP
POLYCHROMASIA BLD QL SMEAR: SLIGHT — SIGNIFICANT CHANGE UP
POTASSIUM SERPL-MCNC: 4.2 MMOL/L — SIGNIFICANT CHANGE UP (ref 3.5–5.3)
POTASSIUM SERPL-SCNC: 4.2 MMOL/L — SIGNIFICANT CHANGE UP (ref 3.5–5.3)
PROT SERPL-MCNC: 7.8 G/DL — SIGNIFICANT CHANGE UP (ref 6.6–8.7)
RBC # BLD: 4.34 M/UL — SIGNIFICANT CHANGE UP (ref 3.8–5.2)
RBC # FLD: 21.2 % — HIGH (ref 10.3–14.5)
RBC BLD AUTO: ABNORMAL
SODIUM SERPL-SCNC: 135 MMOL/L — SIGNIFICANT CHANGE UP (ref 135–145)
VARIANT LYMPHS # BLD: 3.5 % — SIGNIFICANT CHANGE UP (ref 0–6)
WBC # BLD: 5.98 K/UL — SIGNIFICANT CHANGE UP (ref 3.8–10.5)
WBC # FLD AUTO: 5.98 K/UL — SIGNIFICANT CHANGE UP (ref 3.8–10.5)

## 2021-09-12 PROCEDURE — 99284 EMERGENCY DEPT VISIT MOD MDM: CPT

## 2021-09-12 PROCEDURE — 99283 EMERGENCY DEPT VISIT LOW MDM: CPT

## 2021-09-12 NOTE — ED STATDOCS - CARE PLAN
Principal Discharge DX:	Anemia   1 Principal Discharge DX:	Anemia  Secondary Diagnosis:	Postural lightheadedness

## 2021-09-12 NOTE — ED STATDOCS - OBJECTIVE STATEMENT
37y/o F with PMHx of Anemia; history of prior transfusions, on Iron; PSHx of Fibroids, Bariatric surgery presents to the ED c/o dizziness which began 1 week ago. Additional c/o burning sensation to RLE which began 2 months ago. Pt states dizziness is intermittent in nature, lasting 5 mins before resolving and is exacerbated when standing up quickly. She endorses RLE burning is intermittent, recurrent every month and lasts 1 week at a time. LNMP August 13th which was heavy in nature, she endorses this is her baseline. Pt was last transfused here 2 months ago.   : John 35y/o F with PMHx of Anemia; history of prior transfusions, on Iron; PSHx of Fibroids, Bariatric surgery presents to the ED c/o intermittent dizziness which began 1 week ago and intermittent burning sensation to RLE which began 2 months ago. Pt states dizziness is intermittent in nature, lasting approx 5 mins before resolving and is triggered when standing up quickly. She endorses RLE burning is intermittent, recurrent every month and lasts 1 week at a time. LNMP August 13th which was heavy in nature, she endorses this is her baseline. Pt was last transfused here 2 months ago.   : Jonh

## 2021-09-12 NOTE — ED STATDOCS - PROGRESS NOTE DETAILS
MEENA George NOTE: Pt evaluated at bedside. Pt reports she is not bleeding currently. Has appt with her GYN tomorrow in [place]. Pt evaluated prior by intake physician. Otherwise HPI/PE/ROS as noted above. Will follow up plan per intake physician.     H/H stable, advised to c/w iron and f/u with her GYN tomorrow   Pt stable for d/c, reports improvement, VSS, tolerating PO, ambulatory.  Discussion includes results, plan, and return precautions. Pt advised to f/u with PMD 1-2 days and specialists discussed.  Printed copies of available lab/radiology results contained within discharge packet. Pt verbalized understanding/agreement of plan.  ED  Nadia

## 2021-09-12 NOTE — ED STATDOCS - NSICDXPASTSURGICALHX_GEN_ALL_CORE_FT
PAST SURGICAL HISTORY:  No significant past surgical history PAST SURGICAL HISTORY:  H/O gastric bypass     History of myomectomy

## 2021-09-12 NOTE — ED STATDOCS - NSFOLLOWUPINSTRUCTIONS_ED_ALL_ED_FT
- Ck un seguimiento con cantrell médico de atención primaria en 1 o 2 días. Si no puede hacer un seguimiento con cantrell médico de atención primaria, regrese al servicio de urgencias por cualquier problema urgente.  - Busque atención médica inmediata ante cualquier signo o síntoma nuevo, que empeore o que le preocupe.  - Se le entregaron copias de todos los resultados de blanca pruebas, llévelas a cantrell médico de atención primaria para que revise los resultados anormales      - Si tiene dificultades para realizar un seguimiento, brian al: [phone] o visite [email or web address] para obtener un médico o especialista de [facility] que acepte cantrell seguro en cantrell área.    ¡Sentirse mejor!       Anemia    LO QUE NECESITA SABER:    La anemia es cuando el número de glóbulos rojos o la cantidad de hemoglobina en los glóbulos rojos es baja. La hemoglobina es fidel proteína que ayuda a transportar el oxígeno a través de cantrell cuerpo. Los glóbulos rojos usan el chad para crear hemoglobina. La anemia podría desarrollarse si cantrell cuerpo no tiene suficiente chad. También podría desarrollarse si cantrell cuerpo no produce suficientes glóbulos rojos o si ellos mueren antes de que cantrell cuerpo pueda producirlos.    INSTRUCCIONES SOBRE EL SERGIO HOSPITALARIA:    Llame al 911 o ck que alguien llame al 911 en cualquiera de los siguientes casos:  •Usted pierde el conocimiento.      •Usted tiene un dolor intenso en el pecho.      Regrese a la dixie de emergencias si:  •Usted tiene evacuaciones intestinales oscuras o con lawson.          Comuníquese con cantrell médico si:  •Blanca síntomas empeoran, aún después del tratamiento.      •Usted tiene preguntas o inquietudes acerca de cantrell condición o cuidado.      Medicamentos:  •Suplementos de chad o ácido fólicoayudan a aumentar blanca glóbulos rojos y los niveles de hemoglobina.      •Inyecciones de vitamina [id number] ayudar a aumentar el conteo de blanca glóbulos rojos y a disminuir blanca síntomas. Pregunte a cantrell médico cómo se inyecta la B12.      •Playita blanca medicamentos rubi se le haya indicado.Consulte con cantrell médico si usted arturo que cantrell medicamento no le está ayudando o si presenta efectos secundarios. Infórmele si es alérgico a algún medicamento. Mantenga fidel lista actualizada de los medicamentos, las vitaminas y los productos herbales que aydin. Incluya los siguientes datos de los medicamentos: cantidad, frecuencia y motivo de administración. Traiga con usted la lista o los envases de las píldoras a blanca citas de seguimiento. Lleve la lista de los medicamentos con usted en rojas de fidel emergencia.      Evite la anemia:Consuma alimentos sanos altos en chad y vitamina C. Las nueces, carne, vegetales de hojas mandi oscuro y frijoles son altos en chad y proteína. La vitamina C ayuda a cantrell cuerpo a absorber el chad. Los alimentos altos en vitamina C incluyen naranjas y otros cítricos. Pídale a cantrell médico fidel lista de otros alimentos altos en chad y vitamina C. Pregunte si usted necesita llevar fidel dieta especial.    Marquez de chad       Marquez de vitamina C         Acuda a la consulta de control con cantrell médico según las indicaciones:Anote blanca preguntas para que se acuerde de hacerlas erica blanca visitas.

## 2021-09-12 NOTE — ED STATDOCS - CLINICAL SUMMARY MEDICAL DECISION MAKING FREE TEXT BOX
Orthostatic dizziness with history of heavy vaginal bleeding and prior transfusions. Check labs, observation if transfusion required Orthostatic dizziness with history of heavy vaginal bleeding and prior transfusions; last H&H 6.9/24.9 (may 12, 2021). Check labs, observation if transfusion required

## 2021-09-12 NOTE — ED STATDOCS - PATIENT PORTAL LINK FT
You can access the FollowMyHealth Patient Portal offered by [facility] by registering at the following website: [email or web address]. By joining [facility]’s FollowMyHealth portal, you will also be able to view your health information using other applications (apps) compatible with our system.

## 2021-09-12 NOTE — ED STATDOCS - NS_ ATTENDINGSCRIBEDETAILS _ED_A_ED_FT
I, Kenton Kulkarni, performed the initial face to face bedside interview with this patient regarding history of present illness, review of symptoms and relevant past medical, social and family history.  I completed an independent physical examination.  I was the provider who initially evaluated this patient.  The history, relevant review of systems, past medical and surgical history, medical decision making, and physical examination was documented by the scribe in my presence and I attest to the accuracy of the documentation. Follow-up on ordered tests (ie labs, radiologic studies) and re-evaluation of the patient's status has been communicated to the ACP.  Disposition of the patient will be based on test outcome and response to ED interventions.

## 2021-09-13 DIAGNOSIS — Z98.890 OTHER SPECIFIED POSTPROCEDURAL STATES: Chronic | ICD-10-CM

## 2021-09-13 DIAGNOSIS — Z98.84 BARIATRIC SURGERY STATUS: Chronic | ICD-10-CM

## 2021-10-09 NOTE — ED ADULT NURSE NOTE - HOW OFTEN DO YOU HAVE A DRINK CONTAINING ALCOHOL?
PAST MEDICAL HISTORY:  Anxiety with obsessional features OCD    CAD (coronary artery disease) 30 % blockage per pt.    Cardiac arrhythmia Had loop recorder previously.    Hyperlipidemia     Kidney stone 10/2019    Leiomyoma of uterus     Obesity     UPJ (ureteropelvic junction) obstruction right    
Never

## 2022-06-28 NOTE — ED PROVIDER NOTE - CARE PLAN
Post-Op Assessment Note    CV Status:  Stable  Pain Score: 2    Pain management: adequate     Mental Status:  Alert and awake   Hydration Status:  Euvolemic   PONV Controlled:  Controlled   Airway Patency:  Patent      Post Op Vitals Reviewed: Yes      Staff: Anesthesiologist         No complications documented      BP (!) 177/78 (06/28/22 1601)    Temp      Pulse 66 (06/28/22 1601)   Resp 14 (06/28/22 1601)    SpO2 99 % (06/28/22 1601) Principal Discharge DX:	Anemia

## 2023-03-28 NOTE — ED ADULT NURSE NOTE - NSSEPSISSUSPECTED_ED_A_ED
c/o generalized weakness since discharged from hospital on Friday. States has bilateral nephrostomy tubes and one of the wires came out. Pt appears drowsy, weak and pale. HK=341
No

## 2024-12-26 NOTE — ED STATDOCS - SCRIBE NAME
Last seen by PCP Dr. Zhou 1/2023 to establish care.  Will perform annual today and schedule 6 month f/u with Dr. Zhou  Does f/u with OBGYN - sees Dr. Styles at [facility], last 5/2024, f/u scheduled 5/2025  Encouraged to keep this appointment and f/u regularly   Nai Leary